# Patient Record
Sex: MALE | Race: WHITE | NOT HISPANIC OR LATINO | Employment: FULL TIME | URBAN - METROPOLITAN AREA
[De-identification: names, ages, dates, MRNs, and addresses within clinical notes are randomized per-mention and may not be internally consistent; named-entity substitution may affect disease eponyms.]

---

## 2023-01-09 ENCOUNTER — HOSPITAL ENCOUNTER (EMERGENCY)
Facility: CLINIC | Age: 53
Discharge: HOME | End: 2023-01-09
Attending: EMERGENCY MEDICINE | Admitting: EMERGENCY MEDICINE
Payer: COMMERCIAL

## 2023-01-09 VITALS
BODY MASS INDEX: 31.69 KG/M2 | DIASTOLIC BLOOD PRESSURE: 93 MMHG | HEIGHT: 72 IN | RESPIRATION RATE: 13 BRPM | HEART RATE: 72 BPM | OXYGEN SATURATION: 97 % | TEMPERATURE: 99 F | WEIGHT: 234 LBS | SYSTOLIC BLOOD PRESSURE: 152 MMHG

## 2023-01-09 DIAGNOSIS — I48.91 NEW ONSET ATRIAL FIBRILLATION (H): ICD-10-CM

## 2023-01-09 LAB
ANION GAP SERPL CALCULATED.3IONS-SCNC: 12 MMOL/L (ref 5–18)
APTT PPP: 25 SECONDS (ref 22–38)
ATRIAL RATE - MUSE: 375 BPM
ATRIAL RATE - MUSE: 90 BPM
BASOPHILS # BLD AUTO: 0 10E3/UL (ref 0–0.2)
BASOPHILS NFR BLD AUTO: 1 %
BUN SERPL-MCNC: 12 MG/DL (ref 8–22)
CALCIUM SERPL-MCNC: 9.1 MG/DL (ref 8.5–10.5)
CHLORIDE BLD-SCNC: 112 MMOL/L (ref 98–107)
CO2 SERPL-SCNC: 17 MMOL/L (ref 22–31)
CREAT SERPL-MCNC: 1.12 MG/DL (ref 0.7–1.3)
DIASTOLIC BLOOD PRESSURE - MUSE: 86 MMHG
DIASTOLIC BLOOD PRESSURE - MUSE: NORMAL MMHG
EOSINOPHIL # BLD AUTO: 0.3 10E3/UL (ref 0–0.7)
EOSINOPHIL NFR BLD AUTO: 5 %
ERYTHROCYTE [DISTWIDTH] IN BLOOD BY AUTOMATED COUNT: 11.9 % (ref 10–15)
GFR SERPL CREATININE-BSD FRML MDRD: 80 ML/MIN/1.73M2
GLUCOSE BLD-MCNC: 94 MG/DL (ref 70–125)
HCT VFR BLD AUTO: 43.2 % (ref 40–53)
HGB BLD-MCNC: 15 G/DL (ref 13.3–17.7)
HOLD SPECIMEN: NORMAL
HOLD SPECIMEN: NORMAL
IMM GRANULOCYTES # BLD: 0 10E3/UL
IMM GRANULOCYTES NFR BLD: 0 %
INR PPP: 1.06 (ref 0.85–1.15)
INR PPP: 1.08 (ref 0.85–1.15)
INTERPRETATION ECG - MUSE: NORMAL
INTERPRETATION ECG - MUSE: NORMAL
LYMPHOCYTES # BLD AUTO: 2.6 10E3/UL (ref 0.8–5.3)
LYMPHOCYTES NFR BLD AUTO: 40 %
MAGNESIUM SERPL-MCNC: 2.1 MG/DL (ref 1.8–2.6)
MCH RBC QN AUTO: 33 PG (ref 26.5–33)
MCHC RBC AUTO-ENTMCNC: 34.7 G/DL (ref 31.5–36.5)
MCV RBC AUTO: 95 FL (ref 78–100)
MONOCYTES # BLD AUTO: 0.4 10E3/UL (ref 0–1.3)
MONOCYTES NFR BLD AUTO: 7 %
NEUTROPHILS # BLD AUTO: 3 10E3/UL (ref 1.6–8.3)
NEUTROPHILS NFR BLD AUTO: 47 %
NRBC # BLD AUTO: 0 10E3/UL
NRBC BLD AUTO-RTO: 0 /100
P AXIS - MUSE: 51 DEGREES
P AXIS - MUSE: NORMAL DEGREES
PLATELET # BLD AUTO: 164 10E3/UL (ref 150–450)
POTASSIUM BLD-SCNC: 3.9 MMOL/L (ref 3.5–5)
PR INTERVAL - MUSE: 150 MS
PR INTERVAL - MUSE: NORMAL MS
QRS DURATION - MUSE: 96 MS
QRS DURATION - MUSE: 98 MS
QT - MUSE: 330 MS
QT - MUSE: 356 MS
QTC - MUSE: 435 MS
QTC - MUSE: 442 MS
R AXIS - MUSE: 13 DEGREES
R AXIS - MUSE: 8 DEGREES
RBC # BLD AUTO: 4.54 10E6/UL (ref 4.4–5.9)
SODIUM SERPL-SCNC: 141 MMOL/L (ref 136–145)
SYSTOLIC BLOOD PRESSURE - MUSE: 157 MMHG
SYSTOLIC BLOOD PRESSURE - MUSE: NORMAL MMHG
T AXIS - MUSE: 44 DEGREES
T AXIS - MUSE: 52 DEGREES
TROPONIN T BLD-MCNC: 0.13 UG/L
VENTRICULAR RATE- MUSE: 108 BPM
VENTRICULAR RATE- MUSE: 90 BPM
WBC # BLD AUTO: 6.5 10E3/UL (ref 4–11)

## 2023-01-09 PROCEDURE — 93005 ELECTROCARDIOGRAM TRACING: CPT | Mod: XU | Performed by: EMERGENCY MEDICINE

## 2023-01-09 PROCEDURE — 84484 ASSAY OF TROPONIN QUANT: CPT | Mod: 91 | Performed by: EMERGENCY MEDICINE

## 2023-01-09 PROCEDURE — 84443 ASSAY THYROID STIM HORMONE: CPT | Performed by: EMERGENCY MEDICINE

## 2023-01-09 PROCEDURE — 258N000003 HC RX IP 258 OP 636: Performed by: EMERGENCY MEDICINE

## 2023-01-09 PROCEDURE — 96361 HYDRATE IV INFUSION ADD-ON: CPT

## 2023-01-09 PROCEDURE — 96374 THER/PROPH/DIAG INJ IV PUSH: CPT

## 2023-01-09 PROCEDURE — 36415 COLL VENOUS BLD VENIPUNCTURE: CPT | Performed by: EMERGENCY MEDICINE

## 2023-01-09 PROCEDURE — 85014 HEMATOCRIT: CPT | Performed by: EMERGENCY MEDICINE

## 2023-01-09 PROCEDURE — 250N000011 HC RX IP 250 OP 636: Performed by: EMERGENCY MEDICINE

## 2023-01-09 PROCEDURE — 85730 THROMBOPLASTIN TIME PARTIAL: CPT | Performed by: EMERGENCY MEDICINE

## 2023-01-09 PROCEDURE — 250N000013 HC RX MED GY IP 250 OP 250 PS 637: Performed by: EMERGENCY MEDICINE

## 2023-01-09 PROCEDURE — 92960 CARDIOVERSION ELECTRIC EXT: CPT

## 2023-01-09 PROCEDURE — 83735 ASSAY OF MAGNESIUM: CPT | Performed by: EMERGENCY MEDICINE

## 2023-01-09 PROCEDURE — 80048 BASIC METABOLIC PNL TOTAL CA: CPT | Performed by: EMERGENCY MEDICINE

## 2023-01-09 PROCEDURE — 99285 EMERGENCY DEPT VISIT HI MDM: CPT | Mod: 25

## 2023-01-09 PROCEDURE — 84484 ASSAY OF TROPONIN QUANT: CPT | Performed by: EMERGENCY MEDICINE

## 2023-01-09 PROCEDURE — 93005 ELECTROCARDIOGRAM TRACING: CPT | Mod: XU

## 2023-01-09 PROCEDURE — 85610 PROTHROMBIN TIME: CPT | Performed by: EMERGENCY MEDICINE

## 2023-01-09 RX ORDER — PROPOFOL 10 MG/ML
0.5 INJECTION, EMULSION INTRAVENOUS ONCE
Status: COMPLETED | OUTPATIENT
Start: 2023-01-09 | End: 2023-01-09

## 2023-01-09 RX ORDER — DILTIAZEM HYDROCHLORIDE 5 MG/ML
25 INJECTION INTRAVENOUS ONCE
Status: COMPLETED | OUTPATIENT
Start: 2023-01-09 | End: 2023-01-09

## 2023-01-09 RX ORDER — METOPROLOL SUCCINATE 25 MG/1
25 TABLET, EXTENDED RELEASE ORAL DAILY
Qty: 30 TABLET | Refills: 0 | Status: SHIPPED | OUTPATIENT
Start: 2023-01-09

## 2023-01-09 RX ADMIN — APIXABAN 5 MG: 5 TABLET, FILM COATED ORAL at 22:30

## 2023-01-09 RX ADMIN — DILTIAZEM HYDROCHLORIDE 25 MG: 5 INJECTION, SOLUTION INTRAVENOUS at 20:16

## 2023-01-09 RX ADMIN — SODIUM CHLORIDE 1000 ML: 9 INJECTION, SOLUTION INTRAVENOUS at 20:16

## 2023-01-09 ASSESSMENT — ENCOUNTER SYMPTOMS
EYE REDNESS: 1
SHORTNESS OF BREATH: 0
DIAPHORESIS: 0
LIGHT-HEADEDNESS: 1
PALPITATIONS: 1
NAUSEA: 0

## 2023-01-09 ASSESSMENT — ACTIVITIES OF DAILY LIVING (ADL): ADLS_ACUITY_SCORE: 35

## 2023-01-10 LAB
TROPONIN I SERPL HS-MCNC: 5 NG/L
TSH SERPL DL<=0.005 MIU/L-ACNC: 3.81 UIU/ML (ref 0.3–4.2)

## 2023-01-10 NOTE — ED NOTES
Pt reports that his heart is not racing as fast upon arrival. Pt reports no pain. Alert and orientated x3.

## 2023-01-10 NOTE — ED NOTES
Expected Patient Referral to ED  6:52 PM    Referring Clinic/Provider:  Dr. Barrera at [facility] urgent care    Reason for referral/Clinical facts:  New onset A. Fib  The patient is a 52-year-old male with new onset A. fib since yesterday morning.  His heart rate in the urgent care was in the 130s.  He was only mildly symptomatic with some lightheadedness but his blood pressure was 164/97.  No chest pain or shortness of breath.    Recommendations provided:  Send to ED for further evaluation    Caller was informed that this institution does possess the capabilities and/or resources to provide for patient and should be transferred to our facility.    Discussed that if direct admit is sought and any hurdles are encountered, this ED would be happy to see the patient and evaluate.    Informed caller that recommendations provided are recommendations based only on the facts provided and that they responsible to accept or reject the advice, or to seek a formal in person consultation as needed and that this ED will see/treat patient should they arrive.      Lee Bowen MD  [facility] EMERGENCY ROOM  [address] [address] [phone]       Lee Bowen MD  01/09/23 8767

## 2023-01-10 NOTE — ED NOTES
Placed IV 22 g in hands, pt reports burns but works. Labs were unable to get from IV. Will get another IV.

## 2023-01-10 NOTE — ED NOTES
Pt alert and orientated x3. Feels better he stated, heart is not racing. Pt is talking and walking appropriate.

## 2023-01-10 NOTE — ED TRIAGE NOTES
Arrives to ED from  with new onset a-fib RVR. Denies CP. Endorses mild SOB. Sx began yesterday morning.      Triage Assessment     Row Name 01/09/23 1913       Triage Assessment (Adult)    Airway WDL WDL       Respiratory WDL    Respiratory WDL WDL       Skin Circulation/Temperature WDL    Skin Circulation/Temperature WDL WDL       Cardiac WDL    Cardiac WDL X;rhythm    Pulse Rate & Regularity tachycardic       Peripheral/Neurovascular WDL    Peripheral Neurovascular WDL WDL       Cognitive/Neuro/Behavioral WDL    Cognitive/Neuro/Behavioral WDL WDL

## 2023-01-10 NOTE — ED PROVIDER NOTES
Emergency Department Encounter      NAME: David Levy  AGE: 52 year old male  YOB: 1970  MRN: [id number]  EVALUATION DATE & TIME: 2023  7:22 PM    PCP: No primary care provider on file.    ED PROVIDER: Lee Bowen M.D.      Chief Complaint   Patient presents with     Atrial Fib         FINAL IMPRESSION:  1. New onset atrial fibrillation (H)        MEDICAL DECISION MAKIN:39 PM I met with the patient, obtained history, performed an initial exam, and discussed options and plan for diagnostics and treatment here in the ED.   9:14 PM I rechecked and updated the patient with laboratory and EKG results.  9:22 PM I spoke with cardiologist Dr. Ballard.  He thought the patient would be okay to be cardioverted out of atrial fibrillation.  We discussed the i-STAT troponin of 0.13.  He recommended putting the patient on Eliquis for the short-term as well as metoprolol XL 25 mg daily.  He thought the patient could follow-up with the EP or the rapid access clinic if he has been cardioverted back to sinus rhythm.  9:43 PM I performed a conscious sedation and cardioversion procedure on the patient.   10:18 PM I rechecked and updated the patient with laboratory results after cardioversion. Patient is back in regular rhythm.    This patient is a 52-year-old male who is referred to the ER by urgent care where he was found to be in new onset A. fib.  The patient says that a month ago he had been worked up for SVT.  He says that he woke up yesterday at 6 AM with a rapid irregular heartbeat.  He had gone to bed at 11:00 the night before so he is within 48 hours of onset.  He does not have any specific chest pain or shortness of breath.  The last time he ate was at 5 PM tonight.  On exam he had a rapid irregular heartbeat.  He had some trace bilateral pedal edema but no calf tenderness or signs of cellulitis.  The patient EKG showed the rapid A. fib.  He was given IV fluids and a dose of diltiazem which  brought his rate down into the 80s range.  He remained in A. fib.  His lab work was significant for a troponin on i-STAT machine of 0.13.  I spoke with Dr. Ballard from cardiology.  He was comfortable with me cardioverting the patient in the ER and then started him on anticoagulation and metoprolol before being referred to the rapid access clinic.  I spoke to the patient about this as well as risks and benefits of conscious sedation and cardioversion.  He was comfortable with this plan and consented to the procedures.  The patient was sedated with propofol using a 60 mg dose initially followed shortly thereafter by 40 mg.  When he was deeply sedated the patient was cardioverted using a single 100 J synchronized shock using anterior posterior pads.  A repeat EKG was done which I reviewed and independently interpreted.  After the patient was recovered from his sedation and he was discharged home to follow-up with the cardiology rapid access clinic.  He was given an initial dose of the Eliquis before being discharged.  His CHADS2 score was 0.    Medical Decision Making    History:    Supplemental history from: Documented in HPI, if applicable    External Record(s) reviewed: Documented in [facility] [place], if applicable.    Work Up:    Chart documentation includes differential considered and any EKGs or imaging independently interpreted by provider.    In additional to work up documented, I considered the following work up: See chart documentation, if applicable.    External consultation:    Discussion of management with another provider: See chart documentation, if applicable    Complicating factors:    Care impacted by chronic illness: N/A    Care affected by social determinants of health: N/A    Disposition considerations: Discharge. I prescribed additional prescription strength medication(s) as charted. I considered admission, but discharged patient after significant clinical improvement.    Pertinent Labs & Imaging studies  "reviewed. (See chart for details)    The importance of close follow up was discussed. We reviewed warning signs and symptoms, and I instructed Mr. Levy to return to the emergency department immediately if he develops any new or worsening symptoms. I provided additional verbal discharge instructions. Mr. Levy expressed understanding and agreement with this plan of care, his questions were answered, and he was discharged in stable condition.       MEDICATIONS GIVEN IN THE EMERGENCY:  Medications   0.9% sodium chloride BOLUS (0 mLs Intravenous Stopped 1/9/23 2237)   diltiazem (CARDIZEM) injection 25 mg (25 mg Intravenous Given 1/9/23 2016)   propofol (DIPRIVAN) injection 10 mg/mL vial (53 mg Intravenous Not Given 1/9/23 2229)   apixaban ANTICOAGULANT (ELIQUIS) tablet 5 mg (5 mg Oral Given 1/9/23 2230)       NEW PRESCRIPTIONS STARTED AT TODAY'S ER VISIT:  New Prescriptions    APIXABAN ANTICOAGULANT (ELIQUIS) 5 MG TABLET    Take 1 tablet (5 mg) by mouth 2 times daily    METOPROLOL SUCCINATE ER (TOPROL XL) 25 MG 24 HR TABLET    Take 1 tablet (25 mg) by mouth daily          =================================================================    HPI    Patient information was obtained from: Patient    Use of : N/A        David Levy is a 52 year old male with no past medical history, who presents to the ED by private car.    Patient presents to the ED from Urgent Care. Patient reports an onset of heart palpitations and irregular heartbeat which occurred yesterday at 6:00 AM, which woke him up out of his sleep. He also describes his heart palpitations as a \"funky feeling in [his] chest.\" Patient additionally endorses light headedness and states he feeling like there is \"blood rush[ing] to[his] head\". He endorses bilateral ankle and feet as well. Patient reports no medications taken yet for this.     Patient reports he had a physical examination and cardiology visit 1 month ago. He states he received a " normal EKG and calcium result of 4. Patient does smoke. He denies a past familial history heart issues. Patient states he last had at 5:00 PM.     Patient denies loss of consciousness, chest pain, shortness of breath, nausea, and sweats. No other medical concerns are expressed at this time.      REVIEW OF SYSTEMS   Review of Systems   Constitutional: Negative for diaphoresis.   Eyes: Positive for redness.   Respiratory: Negative for shortness of breath.    Cardiovascular: Positive for palpitations. Negative for chest pain.   Gastrointestinal: Negative for nausea.   Musculoskeletal:        Positive for swollen ankles.   Neurological: Positive for light-headedness. Negative for syncope.   All other systems reviewed and are negative.       PAST MEDICAL HISTORY:  History reviewed. No pertinent past medical history.    PAST SURGICAL HISTORY:  History reviewed. No pertinent surgical history.    CURRENT MEDICATIONS:    No current facility-administered medications for this encounter.    Current Outpatient Medications:      apixaban ANTICOAGULANT (ELIQUIS) 5 MG tablet, Take 1 tablet (5 mg) by mouth 2 times daily, Disp: 14 tablet, Rfl: 0     metoprolol succinate ER (TOPROL XL) 25 MG 24 hr tablet, Take 1 tablet (25 mg) by mouth daily, Disp: 30 tablet, Rfl: 0    ALLERGIES:  Allergies   Allergen Reactions     Walnuts [Nuts] Anaphylaxis     Lanolin Rash       FAMILY HISTORY:  History reviewed. No pertinent family history.    SOCIAL HISTORY:        PHYSICAL EXAM:    Vitals: BP (!) 152/81   Pulse 76   Temp 99  F (37.2  C) (Temporal)   Resp 13   Ht 1.829 m (6')   Wt 106.1 kg (234 lb)   SpO2 97%   BMI 31.74 kg/m     Constitutional: Well developed, well nourished. Comfortable appearing.  HEAD:Normocephalic, atraumatic,   Eyes: PERRLA, EOM intact, conjunctiva clear, no discharge  ENT: mucous membranes moist, nose normal.   Neck- Non tender thyroid. Supple, gross ROM intact.  No JVD.  No palpable nodes.  Pulmonary: Clear to  auscultation bilaterally, no respiratory distress, no wheezing, speaks full sentences easily.  Chest: No chest wall tenderness  Cardiovascular: Rapid heart rate, irregular rhythm, no murmurs.  2+ DP pulses.   GI: Soft, no tenderness to deep palpation in all quadrants, not distended, no masses.  No hepatosplenomegaly.  Musculoskeletal: Moving all 4 extremities intentionally and without pain. No obvious deformity. Trace bilateral lower leg edema. No calf tenderness.  Back: No CVA tenderness  Skin: Warm, dry, no rash.  Neurologic: Alert & oriented x 3, speech clear, moving all extremities spontaneously   Psychiatric: Affect normal, cooperative.     LAB:  All pertinent labs reviewed and interpreted.  Labs Ordered and Resulted from Time of ED Arrival to Time of ED Departure   BASIC METABOLIC PANEL - Abnormal       Result Value    Sodium 141      Potassium 3.9      Chloride 112 (*)     Carbon Dioxide (CO2) 17 (*)     Anion Gap 12      Urea Nitrogen 12      Creatinine 1.12      Calcium 9.1      Glucose 94      GFR Estimate 80     ISTAT TROPONIN POCT - Abnormal    TROPPC POCT 0.13 (*)    INR - Normal    INR 1.08     PARTIAL THROMBOPLASTIN TIME - Normal    aPTT 25     MAGNESIUM - Normal    Magnesium 2.1     INR - Normal    INR 1.06     CBC WITH PLATELETS AND DIFFERENTIAL    WBC Count 6.5      RBC Count 4.54      Hemoglobin 15.0      Hematocrit 43.2      MCV 95      MCH 33.0      MCHC 34.7      RDW 11.9      Platelet Count 164      % Neutrophils 47      % Lymphocytes 40      % Monocytes 7      % Eosinophils 5      % Basophils 1      % Immature Granulocytes 0      NRBCs per 100 WBC 0      Absolute Neutrophils 3.0      Absolute Lymphocytes 2.6      Absolute Monocytes 0.4      Absolute Eosinophils 0.3      Absolute Basophils 0.0      Absolute Immature Granulocytes 0.0      Absolute NRBCs 0.0     TSH WITH FREE T4 REFLEX   TROPONIN I       RADIOLOGY:  -Cardioversion External    (Results Pending)       EKG:   Performed at:  19:16  Impression: Abnormal ECG  Rate: 108 BPM  Rhythm: Atrial fibrillation with rapid ventricular response  QRS Interval: 96 ms  QTc Interval: 442 ms  Comparison: 1/9/2023  ST Changes: No significant change was found.     (Repeat EKG)    Performed at: 22:06  Impression: Normal ECG  Rate: 90 BPM  Rhythm: Sinus  QRS Interval: 98 ms  QTc Interval: 435 ms  Comparison: 1/9/2023  ST Changes: Sinus rhythm has replaced Atrial fibrillation     I have independently reviewed and interpreted the EKG(s) documented above.     PROCEDURES:   [facility]    -Cardioversion External    Date/Time: 1/9/2023 9:44 PM  Performed by: Elsi Spear  Authorized by: Elsi Spear     Risks, benefits and alternatives discussed.    ED EVALUATION:      I have performed an Emergency Department Evaluation including taking a history and physical examination, this evaluation will be documented in the electronic medical record for this ED encounter.      ASA Class: Class 1- healthy patient    Mallampati: Grade 1- soft palate, uvula, tonsillar pillars, and posterior pharyngeal wall visible    NPO Status: appropriately NPO for procedure    UNIVERSAL PROTOCOL   Site Marked: NA  Prior Images Obtained and Reviewed:  NA  Required items: Required blood products, implants, devices and special equipment available    Patient identity confirmed:  Verbally with patient  Patient was reevaluated immediately before administering moderate or deep sedation or anesthesia  Confirmation Checklist:  Patient's identity using two indicators, relevant allergies, procedure was appropriate and matched the consent or emergent situation and correct equipment/implants were available  Time out: Immediately prior to the procedure a time out was called    Universal Protocol: the Joint Commission Universal Protocol was followed    Preparation: Patient was prepped and draped in usual sterile fashion      SEDATION  Patient Sedated: Yes    Sedation  Type:  Deep  Sedation:  Propofol  Vital signs: Vital signs monitored during sedation      PRE-PROCEDURE DETAILS:     Cardioversion basis:  Emergent    Rhythm:  Atrial fibrillation    Electrode placement:  Anterior-posterior  Attempt one:     Cardioversion mode:  Synchronous    Waveform:  Biphasic    Shock (Joules):  100    Shock outcome:  Conversion to normal sinus rhythm  Post-procedure details:     Patient status:  Awake      PROCEDURE    Patient Tolerance:  Patient tolerated the procedure well with no immediate complications  Length of time physician/provider present for 1:1 monitoring during sedation: 20       IElsi, am serving as a scribe to document services personally performed by Dr. Lee Bowen based on my observation and the provider's statements to me. ILee M.D. attest that Elsi Spear is acting in a scribe capacity, has observed my performance of the services and has documented them in accordance with my direction.      Lee Bowen M.D.  Emergency Medicine  [facility] EMERGENCY ROOM  [address] [place] [id number]  [phone]  Dept: [phone]     Lee Bowen MD  01/09/23 9721